# Patient Record
Sex: FEMALE | Race: ASIAN
[De-identification: names, ages, dates, MRNs, and addresses within clinical notes are randomized per-mention and may not be internally consistent; named-entity substitution may affect disease eponyms.]

---

## 2017-12-24 ENCOUNTER — HEALTH MAINTENANCE LETTER (OUTPATIENT)
Age: 27
End: 2017-12-24

## 2020-10-22 ENCOUNTER — TELEPHONE (OUTPATIENT)
Dept: FAMILY MEDICINE | Facility: CLINIC | Age: 30
End: 2020-10-22

## 2020-10-22 NOTE — TELEPHONE ENCOUNTER
[facility] phone call message- general phone call:    Reason for call: Patient states she and spouse were exposed 2 days ago to spouse's son who tested positive  For covid19. She states she has no symptoms and wants testing. Please call and advise.     Return call needed: Yes    OK to leave a message on voice mail? Yes    Primary language: English      needed? No    Call taken on October 22, 2020 at 4:32 PM by Reyna Gentile

## 2020-10-23 DIAGNOSIS — Z20.822 EXPOSURE TO 2019 NOVEL CORONAVIRUS: ICD-10-CM

## 2020-10-23 DIAGNOSIS — Z20.822 EXPOSURE TO 2019 NOVEL CORONAVIRUS: Primary | ICD-10-CM

## 2020-10-23 LAB
COVID-19 VIRUS PCR TO U OF MN - SOURCE: NORMAL
SARS-COV-2 RNA SPEC QL NAA+PROBE: NOT DETECTED

## 2020-10-23 PROCEDURE — 99207 PR NO BILLABLE SERVICE THIS VISIT: CPT

## 2020-10-23 NOTE — TELEPHONE ENCOUNTER
Boyfriend's son tested positive after visiting his mother over past weekend. Patient requesting to be tested, currently asymptomatic. Meets guidelines for testing, will place order and schedule patient and significant other for testing. Wiley GRAHAM

## 2020-10-23 NOTE — LETTER
October 26, 2020      Ezequiel Fang Rell Deeg  [address] [facility] [place] [address]        Dear ,    We are writing to inform you of your test results.    Your test results fall within the expected range(s) or remain unchanged from previous results.  Please continue with current treatment plan.    Resulted Orders   COVID-19 Virus PCR MRF ([facility])   Result Value Ref Range    COVID-19 Virus PCR to [facility] - Source Nasopharyngeal     COVID-19 Virus PCR to [facility] - Result Not Detected       Comment:      Collection of multiple specimens from the same patient may be necessary to  detect the virus. The possibility of a false negative should be considered if  the patient's recent exposure or clinical presentation suggests 2019 nCOV  infection and diagnostic tests for other causes of illness are negative.   Repeat  testing may be considered in this setting.  Patient sample was heat inactivated and amplified using the HDPCR SARS-CoV-2  assay ([employer] The HDPCRTM SARS-CoV-2 assay is a reverse  transcription real-time polymerase chain reaction (qRT-PCR) test intended for  the qualitative detection of nucleic acid  from SARS-CoV-2 in human nasopharyngeal swabs, oropharyngeal swabs, anterior  nasal swabs, mid-turbinate nasal swabs as well as nasal aspirate, nasal wash,  and bronchoalveolar lavage (BAL) specimens from individuals who are suspected  of COVID-19 by their healthcare provider.  A negative result does not rule out the presence of real-time PCR inhibitors   in  the specimen  or COVID-19 RNA in concentrations below the limit of detection of  the assay. The possibility of a false negative should be considered if the  patients recent exposure or clinical presentation suggests COVID-19.   Additional  testing or repeat testing requires consultation with the laboratory.  Nasopharyngeal specimen is the preferred choice for swab-based SARS CoV2  testing. When collection of a nasopharyngeal swab is not possible  the   following  are acceptable alternatives:  an oropharyngeal (OP) specimen collected by a healthcare professional, or a  nasal mid-turbinate (NMT) swab collected by a healthcare professional or by  onsite self-collection (using a flocked tapered swab), or an anterior nares  specimen collected by a healthcare professional or by onsite self-collection  (using a round foam swab). (Centers for Disease Control)  Testing performed by [facility] ([facility]) [address] [facility] [place] [address]   The test performance characteristics were determined by [facility]. It has not been  cleared or approved by the FDA.  The laboratory is regulated under the Clinical Laboratory Improvement  Amendments of 1988 (CLIA-88) as qualified to perform high-complexity testing.  This test is used for clinical purposes. It should not be regarded as  investigational or for research.  Performed and/or entered by:  [facility]  [address] [place] [address]       Narrative    Test performed by:  [facility]  [address], [place] [address]       If you have any questions or concerns, please call the clinic at the number listed above.       Sincerely,        DAVID HONEYCUTT RN

## 2020-10-26 NOTE — TELEPHONE ENCOUNTER
Significant other calling in regards to his and patients covid19 results for testing that was done on 10/23/20, Noitified of negative covid19 results.

## 2020-10-26 NOTE — RESULT ENCOUNTER NOTE
Please inform patient of negative COVID19 result. Recommend to call clinic if experience symptoms: headache, sore throat, cough, sob, increase fatigue without known cause, loss of taste/smell. Thank you. Wiley GRAHAM

## 2020-11-04 ENCOUNTER — OFFICE VISIT (OUTPATIENT)
Dept: FAMILY MEDICINE | Facility: CLINIC | Age: 30
End: 2020-11-04
Payer: COMMERCIAL

## 2020-11-04 DIAGNOSIS — Z20.822 SUSPECTED COVID-19 VIRUS INFECTION: Primary | ICD-10-CM

## 2020-11-04 LAB
COVID-19 VIRUS PCR TO U OF MN - SOURCE: ABNORMAL
SARS-COV-2 RNA SPEC QL NAA+PROBE: ABNORMAL

## 2020-11-04 PROCEDURE — 87635 SARS-COV-2 COVID-19 AMP PRB: CPT | Performed by: STUDENT IN AN ORGANIZED HEALTH CARE EDUCATION/TRAINING PROGRAM

## 2020-11-04 PROCEDURE — 99213 OFFICE O/P EST LOW 20 MIN: CPT | Mod: GC | Performed by: STUDENT IN AN ORGANIZED HEALTH CARE EDUCATION/TRAINING PROGRAM

## 2020-11-04 NOTE — NURSING NOTE
Chief Complaint   Patient presents with     Pharyngitis     sore throat for about 3 days now. Patient did start with coughing, fever and runny nose but that has not resolved. NOT establishing care here.        There were no vitals taken for this visit.    BP Recheck if applicable: NA      ~ Yosef Yuen CMA ([place])  [facility]  Phone: [phone]

## 2020-11-04 NOTE — PROGRESS NOTES
HPI:       Visit conducted in the parking lot of [facility]    Subjective     CC: Ezequiel Mishra  is a 29 year old female who presents to clinic today for the following health issues:     Chief Complaint   Patient presents with     Pharyngitis     sore throat for about 3 days now. Patient did start with coughing, fever and runny nose but that has not resolved. NOT establishing care here.           History:  In the 14 days before your symptoms started, have you had close contact with a COVID-19 (Coronavirus) patient? Yes -  COVID19 positive    In the 14 days before your symptoms started, have you traveled internationally or to a state with high rates of COVID19? No    Do you have a fever? Yes, I felt feverish or had chills     Are you having difficulty breathing? No    Do you have a cough? Yes, it's a dry cough.     Are you experiencing any of the following? Denies Frequent heartburn, Coughing more after eating, Coughing more when lying down and Cough more after exercise    What other symptoms have you experienced? Denies runny nose, blocked sinuses, swollen glands and headache     Do you have any of the following? Denies Unexpected weight loss, Sweating at night, Loss of appetite and Fatigue    Have you ever been diagnosed with asthma, bronchitis, or lung disease? No    Do you smoke? No     Have you ever smoked? I have never smoked       Are there people you know with similar symptoms? Yes     What was the patient(s) diagnosed with?     Have you recently been hospitalized? No    Are you pregnant or breastfeeding?: no  LNMP: 11/02/2020  COVID-19 TESTING 2 weeks ago and was negative    Medication Reconciliation completed             Review of Systems:     C: NEGATIVE for fatigue, unexpected change in weight  E: NEGATIVE for acute vision problems or changes  R: NEGATIVE for significant cough or shortness of breath  CV: NEGATIVE for chest pain, palpitations or new or  worsening peripheral edema  P: NEGATIVE for changes in mood or affect            PMHX:     There is no problem list on file for this patient.      Current Outpatient Medications   Medication Sig Dispense Refill     Ibuprofen (ADVIL PO)        IBUPROFEN PO               No Known Allergies    No results found for this or any previous visit (from the past 24 hour(s)).            Physical Exam:     There were no vitals filed for this visit.  There is no height or weight on file to calculate BMI.    GENERAL: Healthy, alert and no distress  EYES: Eyes grossly normal to inspection, conjunctivae and sclerae normal  RESP: no audible wheeze, cough, or visible cyanosis.  No visible retractions or increased work of breathing.  Able to speak fully in complete sentences.  PSYCH: mentation appears normal, affect normal/bright, judgement and insight intact, normal speech and appearance well-groomed      Assessment and Plan     (Z20.828) Suspected COVID-19 virus infection  (primary encounter diagnosis)  (R05) Cough    Comment: Patient came in requesting COVID-19 testing. Unlikely strep throat since patient does not meet Centor criteria. Considering the patient's symptoms, discussed the natural disease progression of viral URI and COVID-19. Patient verbalized understanding. Questions asked and answered.     Plan:   -COVID-19 Virus PCR MRF ([facility])  -COVID-19 GetWell Loop Referral  -AVS information about COVID-19 given verbally   -Encouraged patient to follow CDC recommendations for household cleaning of frequently touched services.  -Encouraged conservative management (ie. Rest, adequate hydration, relief of nasal congestion/obstruction with nasal drops, and monitoring for disease progression)  -Anticipatory guidance give (hand washing, wear masks)  -Follow-up encouraged if symptoms worsen      Options for treatment and follow-up care were reviewed with the patient and/or guardian. Pt and/or guardian engaged in the decision  making process and verbalized understanding of the options discussed and agreed with the final plan.    Precepted today with: MD Silverio Mclaughlin MD, MPH (PGY 3)  [facility] Resident  Pager: [phone]

## 2020-11-05 NOTE — PROGRESS NOTES
Preceptor Attestation:  Patient's case reviewed and discussed with JOSSELYN CUI MD resident and I evaluated the patient. I agree with written assessment and plan of care.  Supervising Physician:  KELSEY AYOUB MD  [facility]

## 2020-11-05 NOTE — RESULT ENCOUNTER NOTE
Per protocol will send to urgent task physician to review and further advise. Thank you. Wiley GRAHAM

## 2020-11-06 ENCOUNTER — TELEPHONE (OUTPATIENT)
Dept: FAMILY MEDICINE | Facility: CLINIC | Age: 30
End: 2020-11-06

## 2020-11-06 NOTE — TELEPHONE ENCOUNTER
Spoke with Ezequiel Ia and her , informed her of positive COVID19. Isolation precaution reviewed with both patient and her  as he informs me he is also positive. Discussed, encouraged good fluid intake, use of Tylenol as needed for symptoms. Request I mail patient result via mail. Result has been printed and mailed to home address. Wiley GRAHAM

## 2020-11-06 NOTE — TELEPHONE ENCOUNTER
11/6/2020 5:09 PM    I called patient regarding her positive COVID-19 test.    Date of symptom onset: 11/2  Symptoms: Cough    Date of first positive test: 11/4    If you tested positive COVID-19 and show symptoms (fever, cough, body aches or trouble breathing):  Stay home and away from others (self-isolate) until:        At least 10 days have passed since your symptoms started. AND...        You've had no fever-and no medicine that reduces fever-for 3 full days (72 hours). AND...         Your other symptoms have resolved (gotten better).    If you tested positive for COVID-19 but don't show symptoms:  Stay home and away from others (self-isolate) until at least 10 days have passed since the date of your first positive COVID-19 test.     Discussed quarantine (no contact with others, separate bathrooms, separate meals, frequent handwashing).        Josep Wills MD  [facility] Resident, PGY3